# Patient Record
Sex: MALE | Race: WHITE
[De-identification: names, ages, dates, MRNs, and addresses within clinical notes are randomized per-mention and may not be internally consistent; named-entity substitution may affect disease eponyms.]

---

## 2024-08-05 ENCOUNTER — HOSPITAL ENCOUNTER (OUTPATIENT)
Age: 59
Discharge: HOME | End: 2024-08-05
Payer: COMMERCIAL

## 2024-08-05 DIAGNOSIS — G56.02: ICD-10-CM

## 2024-08-05 DIAGNOSIS — R20.2: ICD-10-CM

## 2024-08-05 DIAGNOSIS — G56.01: Primary | ICD-10-CM

## 2024-08-05 PROCEDURE — 95913 NRV CNDJ TEST 13/> STUDIES: CPT

## 2024-08-05 PROCEDURE — 95886 MUSC TEST DONE W/N TEST COMP: CPT

## 2024-08-05 NOTE — NEURO_ITS
NCS and/or EMG Patient Report    
Ordering Doctor: Tami Flores    
DATE OF SERVICE: 08/05/24    
Clinical Summary:    
    
59 year old male patient with symptoms of numbness, tingling, and pain in both   
hands. A bilateral upper extremity EMG/NCS was performed.    
    
Nerve Conduction Studies Summary:    
    
The median-D2 SNAP distal latency was prolonged bilaterally.    
    
The ulnar-D5 SNAP distal latency was prolonged bilaterally.     
    
The median-APB CMAP distal latency was prolonged bilaterally.    
    
The median motor conduction velocity was reduced bilaterally in the forearm   
segment.    
    
Needle Examination Summary:    
    
Needle examination of select muscles of the bilateral upper extremities d  
emonstrated a higher proportion of motor unit action potentials with reduced   
recruitment, increased amplitude, increased duration, and polyphasia in the   
right extensor indices proprius and right first dorsal interosseus muscles.    
    
Impression:    
    
There is electrodiagnostic evidence of the following -    
    
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   
syndrome), with motor fiber demyelination    
2) Chronic, mild, right C8 radiculopathy    
    
    
    
    
    
Multi Select Codes    
Neurology    
Neurology Interp Codes: 05719-11 Musc test done w/n test comp (interp) (2) and   
92147-86 Nr cndj test 13/> studies (interp)

## 2024-08-05 NOTE — NEURO
NCS and/or EMG Patient Report
Ordering Doctor: Tami Flores
DATE OF SERVICE: 08/05/24
Clinical Summary:

59 year old male patient with symptoms of numbness, tingling, and pain in both hands. A bilateral upper extremity EMG/NCS was performed.

Nerve Conduction Studies Summary:

The median-D2 SNAP distal latency was prolonged bilaterally.

The ulnar-D5 SNAP distal latency was prolonged bilaterally. 

The median-APB CMAP distal latency was prolonged bilaterally.

The median motor conduction velocity was reduced bilaterally in the forearm segment.

Needle Examination Summary:

Needle examination of select muscles of the bilateral upper extremities demonstrated a higher proportion of motor unit action potentials with reduced recruitment, increased amplitude, increased duration, and polyphasia in the right extensor indices 
proprius and right first dorsal interosseus muscles.

Impression:

There is electrodiagnostic evidence of the following -

1) Moderate, bilateral median mononeuropathies at the wrists (carpal tunnel syndrome), with motor fiber demyelination
2) Chronic, mild, right C8 radiculopathy





Multi Select Codes
Neurology
Neurology Interp Codes: 93344-11 Musc test done w/n test comp (interp) (2) and 44472-81 Nr cndj test 13/> studies (interp)

## 2024-08-27 ENCOUNTER — HOSPITAL ENCOUNTER (OUTPATIENT)
Dept: HOSPITAL 100 - PSN | Age: 59
Discharge: HOME | End: 2024-08-27
Payer: COMMERCIAL

## 2024-08-27 DIAGNOSIS — Z01.818: Primary | ICD-10-CM

## 2024-08-27 PROCEDURE — 93005 ELECTROCARDIOGRAM TRACING: CPT

## 2024-08-27 NOTE — EKG12_ITS
Test Reason : PREOP 
Blood Pressure : ***/*** mmHG 
Vent. Rate : 065 BPM     Atrial Rate : 065 BPM 
   P-R Int : 146 ms          QRS Dur : 092 ms 
    QT Int : 390 ms       P-R-T Axes : 031 020 039 degrees 
   QTc Int : 405 ms 
  
Normal sinus rhythm 
Normal ECG 
  
Confirmed by Lul Quijano [id number]),  ZACKERY CORRALES ([id number]) on 8/27/2024 10:56:48 AM 
  
Referred By: Zain Munoz           Confirmed By:Lul Quijano